# Patient Record
Sex: FEMALE | Race: WHITE | NOT HISPANIC OR LATINO | URBAN - METROPOLITAN AREA
[De-identification: names, ages, dates, MRNs, and addresses within clinical notes are randomized per-mention and may not be internally consistent; named-entity substitution may affect disease eponyms.]

---

## 2017-08-17 ENCOUNTER — APPOINTMENT (RX ONLY)
Dept: URBAN - METROPOLITAN AREA CLINIC 167 | Facility: CLINIC | Age: 32
Setting detail: DERMATOLOGY
End: 2017-08-17

## 2017-08-17 DIAGNOSIS — D22 MELANOCYTIC NEVI: ICD-10-CM

## 2017-08-17 PROBLEM — D22.39 MELANOCYTIC NEVI OF OTHER PARTS OF FACE: Status: ACTIVE | Noted: 2017-08-17

## 2017-08-17 PROCEDURE — ? TREATMENT REGIMEN

## 2017-08-17 PROCEDURE — ? OTHER

## 2017-08-17 PROCEDURE — ? BIOPSY BY SHAVE METHOD

## 2017-08-17 PROCEDURE — ? COUNSELING

## 2017-08-17 PROCEDURE — 11100: CPT

## 2017-08-17 ASSESSMENT — LOCATION SIMPLE DESCRIPTION DERM: LOCATION SIMPLE: LEFT CHEEK

## 2017-08-17 ASSESSMENT — LOCATION DETAILED DESCRIPTION DERM: LOCATION DETAILED: LEFT CENTRAL MANDIBULAR CHEEK

## 2017-08-17 ASSESSMENT — LOCATION ZONE DERM: LOCATION ZONE: FACE

## 2017-08-17 NOTE — PROCEDURE: TREATMENT REGIMEN
Detail Level: Zone
Plan: Discussed benign nature but pt concerned due to growth and intermittent irritation, wishes for removal. Discussed scarring and risk of recurrence at length, as well as various removal options. Pt opted for shave removal

## 2017-08-17 NOTE — PROCEDURE: OTHER
Note Text (......Xxx Chief Complaint.): This diagnosis correlates with the
Other (Free Text): *note: after shave removal (per procedure note above), pigment noted at wound base. Pt became lightheaded and nauseous during procedure, unable to proceed with further shave removal. Will RTC in near future for eval and likely repeat shave vs. punch/excision of remaining pigment/lesion.\\nAfter procedure was terminated, pt sat up and drank water, felt better without residual nausea, lightheadedness.
Detail Level: Zone

## 2017-08-17 NOTE — PROCEDURE: BIOPSY BY SHAVE METHOD
Biopsy Method: Personna blade
Destruction After The Procedure: No
Notification Instructions: Patient will be notified of biopsy results. However, patient instructed to call the office if not contacted within 2 weeks.
Post-Care Instructions: I reviewed with the patient in detail post-care instructions. Patient is to keep the biopsy site dry overnight, and then apply bacitracin twice daily until healed. Patient may apply hydrogen peroxide soaks to remove any crusting.
Type Of Destruction Used: Curettage
X Size Of Lesion In Cm: 0
Cryotherapy Text: The wound bed was treated with cryotherapy after the biopsy was performed.
Silver Nitrate Text: The wound bed was treated with silver nitrate after the biopsy was performed.
Electrodesiccation Text: The wound bed was treated with electrodesiccation after the biopsy was performed.
Anesthesia Volume In Cc (Will Not Render If 0): 0.5
Curettage Text: The wound bed was treated with curettage after the biopsy was performed.
Consent: Written consent was obtained and risks were reviewed including but not limited to scarring, infection, bleeding, scabbing, incomplete removal, nerve damage and allergy to anesthesia.
Size Of Lesion In Cm: 0.7
Lab Facility: [id number]
Detail Level: Detailed
Hemostasis: Aluminum Chloride
Anesthesia Type: 1% lidocaine without epinephrine
Lab: [id number]
Wound Care: Vaseline
Biopsy Type: H and E
Billing Type: Third-Party Bill
Electrodesiccation And Curettage Text: The wound bed was treated with light electrodesiccation and curettage after the biopsy was performed.
Dressing: bandage

## 2017-08-23 ENCOUNTER — APPOINTMENT (RX ONLY)
Dept: URBAN - METROPOLITAN AREA CLINIC 170 | Facility: CLINIC | Age: 32
Setting detail: DERMATOLOGY
End: 2017-08-23

## 2017-08-23 DIAGNOSIS — S31000A OPEN WOUND(S) (MULTIPLE) OF UNSPECIFIED SITE(S), WITHOUT MENTION OF COMPLICATION: ICD-10-CM

## 2017-08-23 PROBLEM — S01.402A UNSPECIFIED OPEN WOUND OF LEFT CHEEK AND TEMPOROMANDIBULAR AREA, INITIAL ENCOUNTER: Status: ACTIVE | Noted: 2017-08-23

## 2017-08-23 PROCEDURE — ? TREATMENT REGIMEN

## 2017-08-23 PROCEDURE — 99212 OFFICE O/P EST SF 10 MIN: CPT

## 2017-08-23 PROCEDURE — ? COUNSELING

## 2017-08-23 ASSESSMENT — LOCATION DETAILED DESCRIPTION DERM: LOCATION DETAILED: LEFT CENTRAL MANDIBULAR CHEEK

## 2017-08-23 ASSESSMENT — LOCATION ZONE DERM: LOCATION ZONE: FACE

## 2017-08-23 ASSESSMENT — LOCATION SIMPLE DESCRIPTION DERM: LOCATION SIMPLE: LEFT CHEEK

## 2017-08-23 NOTE — PROCEDURE: TREATMENT REGIMEN
Continue Regimen: Aquaphor twice daily \\nWashing with antibacterial soap
Plan: consider repeat shave removal in future pending appearance due to remaining pigment at base (procedure discontinued due to vasovagal)
Initiate Treatment: Vanicream HC around wound for adhesive irritation
Detail Level: Simple

## 2017-11-13 ENCOUNTER — APPOINTMENT (RX ONLY)
Dept: URBAN - METROPOLITAN AREA CLINIC 167 | Facility: CLINIC | Age: 32
Setting detail: DERMATOLOGY
End: 2017-11-13

## 2017-11-13 DIAGNOSIS — S31000A OPEN WOUND(S) (MULTIPLE) OF UNSPECIFIED SITE(S), WITHOUT MENTION OF COMPLICATION: ICD-10-CM | Status: RESOLVED

## 2017-11-13 PROBLEM — T07.XXXA UNSPECIFIED MULTIPLE INJURIES, INITIAL ENCOUNTER: Status: ACTIVE | Noted: 2017-11-13

## 2017-11-13 PROCEDURE — ? COUNSELING

## 2017-11-13 PROCEDURE — 99212 OFFICE O/P EST SF 10 MIN: CPT

## 2017-11-13 PROCEDURE — ? TREATMENT REGIMEN

## 2017-11-13 NOTE — PROCEDURE: TREATMENT REGIMEN
Plan: *wound/scar check today s/p shave removal of nevus\\nSun protection \\nMassage scar with prosil BID\\nRecheck and consider further shave removal vs dermabrasion of raised edge in future if desires (however, pt with h/o vasovagal reaction. currently happy w/ appearance)
Samples Given: Prosil
Detail Level: Zone